# Patient Record
Sex: FEMALE | Race: WHITE
[De-identification: names, ages, dates, MRNs, and addresses within clinical notes are randomized per-mention and may not be internally consistent; named-entity substitution may affect disease eponyms.]

---

## 2018-06-12 ENCOUNTER — HOSPITAL ENCOUNTER (EMERGENCY)
Dept: HOSPITAL 97 - ER | Age: 58
Discharge: HOME | End: 2018-06-12
Payer: SELF-PAY

## 2018-06-12 DIAGNOSIS — I10: ICD-10-CM
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
## 2018-06-12 DIAGNOSIS — J45.909: ICD-10-CM
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
## 2018-06-12 DIAGNOSIS — S29.012A: Primary | ICD-10-CM

## 2018-06-12 DIAGNOSIS — F17.210: ICD-10-CM

## 2018-06-12 PROCEDURE — 99282 EMERGENCY DEPT VISIT SF MDM: CPT

## 2018-06-12 NOTE — EDPHYS
Physician Documentation                                                                           

 [facility]                                                                

Name: Jenna Horvath                                                                             

Age: 57 yrs                                                                                       

Sex: Female                                                                                       

: 1960                                                                                   

MRN: [id number]                                                                                   

Arrival Date: 2018                                                                          

Time: 10:26                                                                                       

Account#: [id number]                                                                            

Bed 12                                                                                            

Private MD: Out, [facility]                                                                    

ED Physician Valente Vides                                                                      

HPI:                                                                                              

                                                                                             

19:11 This 57 yrs old  Female presents to ER via Ambulatory with complaints of Right pm1 

      Shoulder Pain.                                                                              

19:11 The patient or guardian complains of pain. right trapezius. Context: The problem was    pm1 

      sustained at home, resulted from lifting or carrying, skillet, The patient reports no       

      decreased range of motion. The patient reports no obvious deformity. Onset: The             

      symptoms/episode began/occurred 2 month(s) ago. Modifying factors: the symptoms are         

      alleviated by nothing. The symptoms are aggravated by movement, rotation of arm.            

      Associated signs and symptoms: Pertinent negatives: abdominal pain, chest pain, neck        

      pain, Numbness in right arm shortness of breath, tingling. Severity of symptoms: in the     

      emergency department the symptoms are unchanged. The patient has not recently seen a        

      physician, and does not have an established primary care provider, just moved to [facility].      

                                                                                                  

Historical:                                                                                       

- Allergies:                                                                                      

10:29 No Known Allergies;                                                                     sv  

- PMHx:                                                                                           

10:29 Asthma;                                                                                 sv  

10:31 Hypertension;                                                                           sv  

- PSHx:                                                                                           

10:29 right hand; ;                                                                  sv  

                                                                                                  

- Immunization history:: Adult Immunizations up to date.                                          

- Social history:: Smoking status: Patient uses tobacco products, smokes one-half pack            

  cigarettes per day.                                                                             

- Ebola Screening: : No symptoms or risks identified at this time.                                

                                                                                                  

                                                                                                  

ROS:                                                                                              

19:11 Constitutional: Negative for fever, chills, and weight loss, Eyes: Negative for injury, pm1 

      pain, redness, and discharge, ENT: Negative for injury, pain, and discharge, Neck:          

      Negative for injury, pain, and swelling, Cardiovascular: Negative for chest pain,           

      palpitations, and edema, Respiratory: Negative for shortness of breath, cough,              

      wheezing, and pleuritic chest pain, Abdomen/GI: Negative for abdominal pain, nausea,        

      vomiting, diarrhea, and constipation, Back: Negative for injury and pain.                   

19:11 Skin: Negative for injury, rash, and discoloration, Neuro: Negative for headache,           

      weakness, numbness, tingling, and seizure.                                                  

19:11 MS/extremity: Positive for pain, of the right trapezius.                                    

                                                                                                  

Exam:                                                                                             

19:11 Constitutional:  This is a well developed, well nourished patient who is awake, alert,  pm1 

      and in no acute distress. Head/Face:  Normocephalic, atraumatic. Chest/axilla:  Normal      

      chest wall appearance and motion.  Nontender with no deformity.  No lesions are             

      appreciated. Cardiovascular:  Regular rate and rhythm with a normal S1 and S2.  No          

      gallops, murmurs, or rubs.  Normal PMI, no JVD.  No pulse deficits. Respiratory:  Lungs     

      have equal breath sounds bilaterally, clear to auscultation and percussion.  No rales,      

      rhonchi or wheezes noted.  No increased work of breathing, no retractions or nasal          

      flaring. Abdomen/GI:  Soft, non-tender, with normal bowel sounds.  No distension or         

      tympany.  No guarding or rebound.  No evidence of tenderness throughout.                    

19:11 Skin:  Warm, dry with normal turgor.  Normal color with no rashes, no lesions, and no       

      evidence of cellulitis. MS/ Extremity:  Pulses equal, no cyanosis.  Neurovascular           

      intact.  Full, normal range of motion.                                                      

19:11 Back: ROM is normal, normal spinal alignment noted, muscle spasm, is appreciated in the     

      right trapezius.                                                                            

19:11 Neuro: Orientation: is normal, Gait: is steady, at a normal pace, without difficulty.       

                                                                                                  

Vital Signs:                                                                                      

10:29  / 109; Pulse 63; Resp 18; Temp 99.2(TE); Pulse Ox 97% ; Weight 46.27 kg; Height  sv  

      5 ft. 0 in. (152.40 cm); Pain 10/10;                                                        

10:29 Body Mass Index 19.92 (46.27 kg, 152.40 cm)                                             sv  

                                                                                                  

MDM:                                                                                              

11:01 Patient medically screened.                                                             era 

11:13 ED course: Patient requested albuterol refill.                                          pm1 

11:14 Data reviewed: vital signs. Data interpreted: Pulse oximetry: on room air is 97 %.      pm1 

      Interpretation: normal. Counseling: I had a detailed discussion with the patient and/or     

      guardian regarding: the historical points, exam findings, and any diagnostic results        

      supporting the discharge/admit diagnosis, the need for outpatient follow up, to return      

      to the emergency department if symptoms worsen or persist or if there are any questions     

      or concerns that arise at home.                                                             

                                                                                                  

Administered Medications:                                                                         

No medications were administered                                                                  

                                                                                                  

                                                                                                  

Disposition:                                                                                      

18 11:43 Discharged to Home. Impression: Strain of muscle and tendon of back wall of        

  thorax - right trapezius.                                                                       

- Condition is Stable.                                                                            

- Discharge Instructions: Muscle Strain.                                                          

- Prescriptions for Naprosyn 500 mg Oral Tablet - take 1 tablet by ORAL route 2 times             

  per day take with food; 30 tablet. Albuterol Sulfate 2.5 mg /3 mL (0.083 %)                     

  Inhalation Solution for Nebulization - inhale 1 unit by NEBULIZATION route every 8              

  hours As needed; 1 box. Cyclobenzaprine 10 mg Oral Tablet - take 1 tablet by ORAL               

  route every 8 hours As needed; 30 tablet. Tylenol- Codeine #3 300-30 mg Oral Tablet -           

  take 2 tablets by ORAL route every 6 hours As needed; 20 tablet.                                

- Medication Reconciliation Form, Thank You Letter form.                                          

- Follow up: Emergency Department; When: As needed; Reason: Worsening of condition.               

  Follow up: Private Physician; When: 2 - 3 days; Reason: Recheck today's complaints,             

  Continuance of care, Re-evaluation by your physician.                                           

- Problem is new.                                                                                 

- Symptoms have improved.                                                                         

                                                                                                  

                                                                                                  

                                                                                                  

Addendum:                                                                                         

2018                                                                                        

     15:28 Co-signature as Attending Physician, Valente Vides MD I agree with the assessment and  c
ha

           plan of care.                                                                          

                                                                                                  

Signatures:                                                                                       

Daksha Montes De Oca, RN                    Valente Esparza MD MD cha Smirch, Shelby, RN RN ss Marinas, Patrick, NP                    NP   pm1                                                  

                                                                                                  

Corrections: (The following items were deleted from the chart)                                    

                                                                                             

12:01 11:43 2018 11:43 Discharged to Home. Impression: Strain of muscle and tendon of   ss  

      back wall of thorax - right trapezius. Condition is Stable. Forms are Medication            

      Reconciliation Form, Thank You Letter, Antibiotic Education, Prescription Opioid Use.       

      Follow up: Emergency Department; When: As needed; Reason: Worsening of condition.           

      Follow up: Private Physician; When: 2 - 3 days; Reason: Recheck today's complaints,         

      Continuance of care, Re-evaluation by your physician. Problem is new. Symptoms have         

      improved. pm1                                                                               

                                                                                                  

**************************************************************************************************

## 2018-12-19 ENCOUNTER — HOSPITAL ENCOUNTER (EMERGENCY)
Dept: HOSPITAL 97 - ER | Age: 58
LOS: 1 days | Discharge: HOME | End: 2018-12-20
Payer: SELF-PAY

## 2018-12-19 DIAGNOSIS — S16.1XXA: Primary | ICD-10-CM

## 2018-12-19 DIAGNOSIS — F17.210: ICD-10-CM

## 2018-12-19 DIAGNOSIS — J44.9: ICD-10-CM

## 2018-12-19 DIAGNOSIS — S20.229A: ICD-10-CM
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
## 2018-12-19 DIAGNOSIS — J45.909: ICD-10-CM

## 2018-12-19 DIAGNOSIS — S22.41XA: ICD-10-CM

## 2018-12-19 DIAGNOSIS — S20.211A: ICD-10-CM
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
## 2018-12-19 PROCEDURE — 93005 ELECTROCARDIOGRAM TRACING: CPT

## 2018-12-19 PROCEDURE — 85610 PROTHROMBIN TIME: CPT

## 2018-12-19 PROCEDURE — 80076 HEPATIC FUNCTION PANEL: CPT

## 2018-12-19 PROCEDURE — 80307 DRUG TEST PRSMV CHEM ANLYZR: CPT

## 2018-12-19 PROCEDURE — 72125 CT NECK SPINE W/O DYE: CPT

## 2018-12-19 PROCEDURE — 80329 ANALGESICS NON-OPIOID 1 OR 2: CPT

## 2018-12-19 PROCEDURE — 96374 THER/PROPH/DIAG INJ IV PUSH: CPT

## 2018-12-19 PROCEDURE — 71250 CT THORAX DX C-: CPT

## 2018-12-19 PROCEDURE — 36415 COLL VENOUS BLD VENIPUNCTURE: CPT

## 2018-12-19 PROCEDURE — 99285 EMERGENCY DEPT VISIT HI MDM: CPT

## 2018-12-19 PROCEDURE — 81003 URINALYSIS AUTO W/O SCOPE: CPT

## 2018-12-19 PROCEDURE — 96361 HYDRATE IV INFUSION ADD-ON: CPT

## 2018-12-19 PROCEDURE — 85025 COMPLETE CBC W/AUTO DIFF WBC: CPT

## 2018-12-19 PROCEDURE — 70450 CT HEAD/BRAIN W/O DYE: CPT

## 2018-12-19 PROCEDURE — 80320 DRUG SCREEN QUANTALCOHOLS: CPT

## 2018-12-19 PROCEDURE — 80048 BASIC METABOLIC PNL TOTAL CA: CPT

## 2018-12-19 PROCEDURE — 85730 THROMBOPLASTIN TIME PARTIAL: CPT

## 2018-12-20 LAB
ALBUMIN SERPL BCP-MCNC: 4.5 G/DL (ref 3.4–5)
ALP SERPL-CCNC: 76 U/L (ref 45–117)
ALT SERPL W P-5'-P-CCNC: 27 U/L (ref 12–78)
AST SERPL W P-5'-P-CCNC: 24 U/L (ref 15–37)
BUN BLD-MCNC: 20 MG/DL (ref 7–18)
GLUCOSE SERPLBLD-MCNC: 98 MG/DL (ref 74–106)
HCT VFR BLD CALC: 44.2 % (ref 36–45)
INR BLD: 1.02
LYMPHOCYTES # SPEC AUTO: 1.7 K/UL (ref 0.7–4.9)
MCH RBC QN AUTO: 30.6 PG (ref 27–35)
MCV RBC: 90.3 FL (ref 80–100)
METHAMPHET UR QL SCN: NEGATIVE
PMV BLD: 8.7 FL (ref 7.6–11.3)
POTASSIUM SERPL-SCNC: 4 MMOL/L (ref 3.5–5.1)
RBC # BLD: 4.89 M/UL (ref 3.86–4.86)
THC SERPL-MCNC: POSITIVE NG/ML

## 2018-12-20 NOTE — RAD REPORT
EXAM DESCRIPTION:  CT - Head C Spine Cap Wo Con - 12/20/2018 6:31 am

 

CLINICAL HISTORY:  Trauma, head and neck injury.  Chest, abdomen and pelvis pain.

PAIN

 

COMPARISON:  No comparisons

 

TECHNIQUE:  CT head without contrast.

 

CT cervical spine without contrast with coronal and sagittal reformatted images.

 

CT chest, abdomen and pelvis without contrast with coronal and sagittal reformatted images of the [facility]
ne.

 

All CT scans are performed using dose optimization technique as appropriate and may include automated
 exposure control or mA/KV adjustment according to patient size.

 

FINDINGS:  CT HEAD WITHOUT CONTRAST:

 

No intracranial hemorrhage, hydrocephalus or extra-axial fluid collection.  No areas of brain edema o
r midline shift.

 

The paranasal sinuses and mastoids are clear. The calvarium is intact.

 

 

CT CERVICAL SPINE WITHOUT CONTRAST:

 

No fracture or subluxation.  The prevertebral soft tissues are normal in thickness.

 

 

CT CHEST, ABDOMEN, PELVIS WITHOUT CONTRAST:

 

NOTE: Lack of contrast is a significant limitation in the assessment of trauma related findings. Spec
ifically, solid organ, vascular and bowel evaluation is significantly limited.

 

The lungs are mildly emphysematous but clear.No pneumothorax or pericardial/pleural fluid.

 

No evidence of intra-abdominal visceral injury, free fluid or free air is seen within the above detai
led limitations.

 

No concerning pelvic findings.

 

Nondisplaced fractures third and fourth right upper ribs. Moderate lumbosacral degenerative changes.

 

IMPRESSION:  Nondisplaced right upper third and fourth rib fractures.

## 2018-12-20 NOTE — EKG
Test Date:    2018-12-20               Test Time:    00:19:20

Technician:   MT                                     

                                                     

MEASUREMENT RESULTS:                                       

Intervals:                                           

Rate:         82                                     

CT:           152                                    

QRSD:         86                                     

QT:           394                                    

QTc:          460                                    

Axis:                                                

P:            79                                     

CT:           152                                    

QRS:          9                                      

T:            73                                     

                                                     

INTERPRETIVE STATEMENTS:                                       

                                                     

Normal sinus rhythm

Anterior infarct, age undetermined

Abnormal ECG

No previous ECG available for comparison



Electronically Signed On 12-20-18 05:57:15 CST by Prudencio Leyva
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
